# Patient Record
Sex: MALE | Race: WHITE | NOT HISPANIC OR LATINO
[De-identification: names, ages, dates, MRNs, and addresses within clinical notes are randomized per-mention and may not be internally consistent; named-entity substitution may affect disease eponyms.]

---

## 2017-04-07 NOTE — ASU PATIENT PROFILE, ADULT - PSH
S/P CABG X 2  2002  S/P Cataract Surgery  7/22/10 left eye  S/P knee replacement  2010, left knee  S/P Spinal Fusion  4/30/10  S/P Spinal Surgery  X stop 6/5/2009

## 2017-04-07 NOTE — ASU PATIENT PROFILE, ADULT - PMH
CAD (Coronary Artery Disease)    Glaucoma    Hyperlipidemia    Hypertension    Lumbar spinal stenosis    Myocardial Infarction  2002  Osteoarthritis    Peripheral neuropathy    Polymyalgia Rheumatica

## 2017-04-10 ENCOUNTER — TRANSCRIPTION ENCOUNTER (OUTPATIENT)
Age: 82
End: 2017-04-10

## 2017-04-10 ENCOUNTER — OUTPATIENT (OUTPATIENT)
Dept: OUTPATIENT SERVICES | Facility: HOSPITAL | Age: 82
LOS: 1 days | End: 2017-04-10
Payer: MEDICARE

## 2017-04-10 VITALS
SYSTOLIC BLOOD PRESSURE: 131 MMHG | RESPIRATION RATE: 13 BRPM | HEART RATE: 64 BPM | OXYGEN SATURATION: 94 % | DIASTOLIC BLOOD PRESSURE: 73 MMHG

## 2017-04-10 VITALS
HEIGHT: 64 IN | RESPIRATION RATE: 12 BRPM | OXYGEN SATURATION: 97 % | WEIGHT: 214.95 LBS | TEMPERATURE: 98 F | HEART RATE: 66 BPM

## 2017-04-10 DIAGNOSIS — H40.1192 PRIMARY OPEN-ANGLE GLAUCOMA, UNSPECIFIED EYE, MODERATE STAGE: ICD-10-CM

## 2017-04-10 PROCEDURE — C1762: CPT

## 2017-04-10 PROCEDURE — C1783: CPT

## 2017-04-10 PROCEDURE — 66180 AQUEOUS SHUNT EYE W/GRAFT: CPT | Mod: RT

## 2017-04-10 NOTE — ASU DISCHARGE PLAN (ADULT/PEDIATRIC). - SPECIAL INSTRUCTIONS
Leave patch and shield in place until seen by MD tomorrow.  Hold baby aspirin until seen by MD in office tomorrow.

## 2018-01-22 ENCOUNTER — APPOINTMENT (OUTPATIENT)
Dept: ORTHOPEDIC SURGERY | Facility: CLINIC | Age: 83
End: 2018-01-22
Payer: MEDICARE

## 2018-01-22 VITALS — HEIGHT: 69 IN | BODY MASS INDEX: 30.36 KG/M2 | WEIGHT: 205 LBS

## 2018-01-22 DIAGNOSIS — M54.16 RADICULOPATHY, LUMBAR REGION: ICD-10-CM

## 2018-01-22 PROCEDURE — 99213 OFFICE O/P EST LOW 20 MIN: CPT

## 2019-12-25 NOTE — ASU PREOP CHECKLIST - HAND OFF
Patient dressed in room waiting for DC. Instructions and RX provided. No questions. Patient went home with spouse.   yes

## 2020-06-20 ENCOUNTER — EMERGENCY (EMERGENCY)
Facility: HOSPITAL | Age: 85
LOS: 1 days | End: 2020-06-20
Admitting: EMERGENCY MEDICINE
Payer: MEDICARE

## 2020-06-20 PROCEDURE — 99285 EMERGENCY DEPT VISIT HI MDM: CPT

## 2020-06-20 PROCEDURE — 71045 X-RAY EXAM CHEST 1 VIEW: CPT | Mod: 26

## 2020-06-20 PROCEDURE — 70450 CT HEAD/BRAIN W/O DYE: CPT | Mod: 26

## 2021-02-15 NOTE — ASU DISCHARGE PLAN (ADULT/PEDIATRIC). - NOTIFY
1. Continue current cardiac medications    2. Follow up with Dr. Hugo Holt in 6 months     3. Follow up with Dr. Marquita Gonzalez as directed    4. Call with any issues and need for sooner evaluation    5. Weigh yourself daily    -Stay Hydrated    -Diet should sodium restricted to 2 grams    -Again watch your daily weight trends and if you gain water weight please follow below instructions.    -If you gain 3-5 pounds in 2-3 days OR notice that you are retaining fluid in anyway just like you did before then take an extra dose of your water pill (bumetanide/Bumex) every day until you lose the weight or feel better.    -If you notice that you have taken more than 2 extra doses in 1 week then please call and let us know. -If at any time you feel that you are retaining fluid, your medications are not working, or you feel ill in anyway, then please call us for either same day appointment or the next day, and for instructions. Our goal is to keep you out of the emergency room and the hospital and we have ways to do it. You just need to call us in a timely manner.     -If you become sick for other reasons, and notice that you are not urinating as much, the urine is very dark, you have significant diarrhea or vomiting, then please DO NOT take your water pill and CALL US immediately. Swelling that continues/Bleeding that does not stop/Persistent Nausea and Vomiting/Fever greater than 101/Pain not relieved by Medications
